# Patient Record
Sex: MALE | Race: WHITE | NOT HISPANIC OR LATINO | Employment: UNEMPLOYED | URBAN - METROPOLITAN AREA
[De-identification: names, ages, dates, MRNs, and addresses within clinical notes are randomized per-mention and may not be internally consistent; named-entity substitution may affect disease eponyms.]

---

## 2017-09-12 ENCOUNTER — HISTORICAL (OUTPATIENT)
Dept: ADMINISTRATIVE | Facility: HOSPITAL | Age: 57
End: 2017-09-12

## 2017-10-24 ENCOUNTER — HISTORICAL (OUTPATIENT)
Dept: ADMINISTRATIVE | Facility: HOSPITAL | Age: 57
End: 2017-10-24

## 2022-04-29 NOTE — OP NOTE
DATE OF SURGERY:    10/24/2017    SURGEON:  Cheyanne Banda MD    PREOPERATIVE DIAGNOSIS:  Phacolytic glaucoma, neovascular glaucoma with mature cataract.    POSTOPERATIVE DIAGNOSIS:  Phacolytic glaucoma, neovascular glaucoma with mature cataract.    PROCEDURE:  Phacoemulsification with intraocular lens implant of the right eye.    ANESTHESIA:  General.    COMPLICATIONS:  None.    ESTIMATED BLOOD LOSS:  None.    INDICATIONS:  The patient had previous trauma at the age of 10 years old and developed a traumatic cataract and resultant neovascular glaucoma that was treated with cyclocryotherapy.  Following cyclocryotherapy, the patient developed phacolytic glaucoma causing painful right eye.  Different surgical options were discussed with the patient including enucleation.  The patient did not want to undergo enucleation.  Advantages, risks and benefits of surgical intervention with phacoemulsification of intraocular lens implant was discussed with the patient. The patient acknowledged understanding and wishes to proceed.  Informed consent was obtained from the patient in the office.    PROCEDURE IN DETAIL:  The patient was brought into the operating room and placed in supine position.  Anesthesia was undertaken without complications.  The operative eye, periorbital region were prepped and draped in usual manner for intraocular surgery. A wire lid speculum was placed in the operative eye for adequate exposure to the surgical site.  A paracentesis was made temporally.  A small amount of Healon was injected into the anterior chamber followed by Vision Blue for adequate staining of the anterior capsule.  A cataract wound was made at 7 o'clock through clear cornea at the limbus.  A cystotome was used to make can opener capsulorrhexis afte a 25 gauge needle was used to aspirate liquified cortex.  The mature cataract was removed with divide and conquer technique.  The remaining cortical material was removed with  irrigation, aspiration handpiece.  A 18.0 ZC blue lens was placed in the operative eye.  Viscoelastic was removed using the I&D.  10-0 nylon suture was placed at the cataract wound and postoperative injections were given. The patient was cleaned in     wet to dry fashion.  Maxitrol ointment placed on the operative eye followed by pressure patch and shield.  The patient was turned over to Anesthesia in stable condition.        ______________________________  MD BROCK Wilson/KHOI  DD:  10/24/2017  Time:  04:08PM  DT:  10/25/2017  Time:  10:16AM  Job #:  [id number]

## 2022-04-29 NOTE — OP NOTE
DATE OF SURGERY:    9-12-17    SURGEON:  Cheyanne Banda MD    PREOPERATIVE DIAGNOSES:   Chronic angle closure right eye.    POSTOPERATIVE DIAGNOSES:  Same s/p procedure    OPERATION PERFORMED:   Cyclocryotherapy right eye    ANESTHESIA: Mac plus retrobulbar injection.    COMPLICATIONS:  None.    ESTIMATED BLOOD LOSS:  Less than 1 mL.    INDICATIONS:  The patient was evaluated in clinic and noted to elevated iop despite max medication.  Advantages, risks and benefits of different surgical options were discussed.  Informed consent was obtained from the patient in the office.    PROCEDURE IN DETAIL:  The patient was brought into the operating room and placed in supine position. MAC anesthesia was undertaken without complications.   The operative eye and periorbital region were prepped and draped in the usual manner for intraocular surgery. A wire lid speculum was placed in the operative eye for adequate exposure.     Cyclocryotherpy was then merformed inferiorly for 6 clock hours lasting 60 seconds each. .  Maxitrol ointment was applied to the operative eye followed by patch and shield.  The patient tolerated the procedure well and was turned over to anesthesia in stable condition.

## 2024-01-10 ENCOUNTER — RESEARCH ENCOUNTER (OUTPATIENT)
Dept: RESEARCH | Facility: HOSPITAL | Age: 64
End: 2024-01-10

## 2024-01-10 DIAGNOSIS — Z00.6 RESEARCH SUBJECT: Primary | ICD-10-CM

## 2024-01-10 NOTE — RESEARCH
Sponsor: [email or web address]     Study Title/IRB Number: [id number]    Principle Investigator: Dr. Baljit Spencer    Patient eligibility was checked prior to enrollment in the study. Patient met the following inclusion and exclusion criteria:     INCLUSION CRITERIA  Participant age is 50 years or older at the time of signing the Informed Consent form  Participant is capable of giving signed Informed Consent, which includes compliance with the requirements and restrictions in the informed consent form and the protocol    EXCLUSION CRITERIA  Participant is undergoing or referred for diagnostic evaluation due to clinical suspicion for cancer  Participant has a personal history of invasive or hematologic malignancy, diagnosed within the last 3 years prior to expected enrollment date or diagnosed greater than 3 years prior to expected enrollment date and never treated  Participant has had definitive treatment for invasive or hematologic malignancy within the 3 years prior to expected enrollment date  Participant is not able to comply with protocol procedures  Participant is not a current patient at a participating center  Participant is currently enrolled or was previously enrolled in another [employer]-sponsored study  Participant is current or previous employee/contractor of [email or web address]  Participant is currently pregnant (by participant's self-report of pregnancy status)    Prior to the Informed Consent (IC) being signed, or any study protocol required data collection, testing, procedure, or intervention being performed, the following was done and/or discussed:  Patient was given a copy of the IC for review   Purpose of the study and qualifications to participate   Study design, Follow up schedule, and tests or procedures done at each visit  Confidentiality and HIPAA Authorization for Release of Medical Records for the research trial/ subject's rights/research related injury  Risk, Benefits, Alternative Treatments, Compensation and  Costs  Participation in the research trial is voluntary and patient may withdraw at anytime  Contact information for study related questions    Patient verbalizes understanding of the above: Yes  Contact information for CRC and PI given to patient: Yes  Patient able to adequately summarize: the purpose of the study, the risks associated with the study, and all procedures, testing, and follow-ups associated with the study: Yes    Patient signed the informed consent form for the research study with an IRB approval date of 4/25/2022. Each page of the consent form was reviewed with patient and all questions answered satisfactorily.   Patient received a copy of the consent form. The original consent was scanned into electronic medical records.    Anthropometric Data    Participant is a 63 y.o., White, Not  or /a, male  Participant height:   01/10/2024   6'3   Participant weight:   Wt Readings from Last 1 Encounters:   01/10/2024    210       Smoking History and Alcohol use     Please indicate whether the participant smoked at least 100 cigarettes in their lifetime:   No  Please indicate whether the participant is a current smoker:  No  Age participant started smoking cigarettes: Not Applicable  Age participant stopped smoking cigarettes: Not Applicable  During their time as a smoker, please indicate if the participant stopped smoking for a month or more: Not Applicable  Please indicate how many cigarettes per day did the participant smoke on average for the majority of their time as a smoker: Blank single: Not Applicable    During the last 12 months, how often did the participant have any kind of drink containing alcohol? Count as one drink a 12 ounce can or glass of beer, a 5 ounce glass of wine, or a drink containing 1 shot of liquor. Participant has never consumed alcohol before during their life}  During the last 12 months, how many drinks did the participant have on days when they drank alcohol?Not  Applicable    Blood Draw    Following IC being signed and prior to blood draw, patient completed all baseline/pretest questionnaires. The following specimens were collected from the pt at the time of this encounter via peripheral blood draw.    Blood draw location: Right Arm  Needle used: 21 gauge butterfly needle  Blood draw amount: 40ml  Blood draw time: 11:19 1/10/2024

## 2024-01-23 ENCOUNTER — TELEPHONE (OUTPATIENT)
Dept: RESEARCH | Facility: HOSPITAL | Age: 64
End: 2024-01-23

## 2024-01-23 NOTE — TELEPHONE ENCOUNTER
Eduar Pathfinder 2022.003    Eduar ID: [id number]     Results the [facility] Fernando Multi Cancer Early Detection test and were reviewed by PI Dr Spencer. Patient was called and notified of test results, there was no signal detected.    Patient reminded, this was a screening test, so we still highly encourage them to continue other normal health screenings  and to continue to adhere to guideline-recommended cancer screening.    Patient was asked about completing 30 day questionnaire online and was agreeable.

## 2025-05-23 ENCOUNTER — HOSPITAL ENCOUNTER (OUTPATIENT)
Dept: RADIOLOGY | Facility: HOSPITAL | Age: 65
Discharge: HOME | End: 2025-05-23
Attending: FAMILY MEDICINE
Payer: COMMERCIAL

## 2025-05-23 DIAGNOSIS — H65.02: ICD-10-CM

## 2025-05-23 DIAGNOSIS — H66.92 LEFT OTITIS MEDIA WITH SPONTANEOUS RUPTURE OF EARDRUM: ICD-10-CM

## 2025-05-23 DIAGNOSIS — J01.90 ACUTE SINUSITIS, UNSPECIFIED: ICD-10-CM

## 2025-05-23 DIAGNOSIS — J34.89 OVERDEVELOPMENT OF NASAL BONES: Primary | ICD-10-CM

## 2025-05-23 DIAGNOSIS — H72.92 LEFT OTITIS MEDIA WITH SPONTANEOUS RUPTURE OF EARDRUM: ICD-10-CM

## 2025-05-23 DIAGNOSIS — J34.89 OVERDEVELOPMENT OF NASAL BONES: ICD-10-CM

## 2025-05-23 PROCEDURE — 70486 CT MAXILLOFACIAL W/O DYE: CPT | Mod: TC

## 2025-05-29 DIAGNOSIS — K80.20 CHOLELITHIASIS: Primary | ICD-10-CM

## 2025-05-30 ENCOUNTER — HOSPITAL ENCOUNTER (OUTPATIENT)
Dept: RADIOLOGY | Facility: HOSPITAL | Age: 65
Discharge: HOME | End: 2025-05-30
Attending: COLON & RECTAL SURGERY
Payer: COMMERCIAL

## 2025-05-30 ENCOUNTER — OFFICE VISIT (OUTPATIENT)
Dept: SURGICAL ONCOLOGY | Facility: CLINIC | Age: 65
End: 2025-05-30
Payer: COMMERCIAL

## 2025-05-30 ENCOUNTER — ANESTHESIA EVENT (OUTPATIENT)
Dept: SURGERY | Facility: HOSPITAL | Age: 65
End: 2025-05-30
Payer: COMMERCIAL

## 2025-05-30 VITALS
TEMPERATURE: 98 F | HEART RATE: 72 BPM | SYSTOLIC BLOOD PRESSURE: 128 MMHG | WEIGHT: 198 LBS | HEIGHT: 75 IN | BODY MASS INDEX: 24.62 KG/M2 | OXYGEN SATURATION: 99 % | DIASTOLIC BLOOD PRESSURE: 86 MMHG

## 2025-05-30 DIAGNOSIS — K80.10 CALCULUS OF GALLBLADDER WITH CHOLECYSTITIS WITHOUT BILIARY OBSTRUCTION, UNSPECIFIED CHOLECYSTITIS ACUITY: Primary | ICD-10-CM

## 2025-05-30 DIAGNOSIS — K80.10 CALCULUS OF GALLBLADDER WITH CHOLECYSTITIS WITHOUT BILIARY OBSTRUCTION, UNSPECIFIED CHOLECYSTITIS ACUITY: ICD-10-CM

## 2025-05-30 DIAGNOSIS — K81.0 ACUTE CHOLECYSTITIS: ICD-10-CM

## 2025-05-30 DIAGNOSIS — K80.20 CHOLELITHIASIS: ICD-10-CM

## 2025-05-30 PROCEDURE — 99999 PR PBB SHADOW E&M-EST. PATIENT-LVL III: CPT | Mod: PBBFAC,,, | Performed by: SURGERY

## 2025-05-30 PROCEDURE — 71046 X-RAY EXAM CHEST 2 VIEWS: CPT | Mod: TC

## 2025-05-30 RX ORDER — SODIUM CHLORIDE 9 MG/ML
INJECTION, SOLUTION INTRAVENOUS CONTINUOUS
OUTPATIENT
Start: 2025-05-30

## 2025-05-30 RX ORDER — ATORVASTATIN CALCIUM 10 MG/1
10 TABLET, FILM COATED ORAL DAILY
COMMUNITY

## 2025-05-30 RX ORDER — CEFAZOLIN SODIUM 2 G/50ML
2 SOLUTION INTRAVENOUS
OUTPATIENT
Start: 2025-06-02

## 2025-05-30 RX ORDER — ENOXAPARIN SODIUM 100 MG/ML
30 INJECTION SUBCUTANEOUS EVERY 24 HOURS
OUTPATIENT
Start: 2025-05-30

## 2025-05-30 RX ORDER — MUPIROCIN 20 MG/G
OINTMENT TOPICAL
OUTPATIENT
Start: 2025-05-30

## 2025-05-30 RX ORDER — INDOCYANINE GREEN AND WATER 25 MG
2.5 KIT INJECTION ONCE
OUTPATIENT
Start: 2025-06-02

## 2025-05-30 RX ORDER — METFORMIN HYDROCHLORIDE 500 MG/1
500 TABLET ORAL 2 TIMES DAILY WITH MEALS
COMMUNITY

## 2025-05-30 NOTE — PROGRESS NOTES
Chief complaint:  Cholelithiasis     HPI:  64-year-old male presents with postprandial nausea and food aversion, no fevers, chills or symptoms of jaundice.  Ultrasound reveals gallbladder sludge as well as cholelithiasis no evidence of ductal dilation.  No evidence of biliary obstruction on laboratory studies.        Past Medical and Surgical History  Allergies :   Patient has no known allergies.    @[facility]@  Medical :   He has a past medical history of Diabetes mellitus, type 2 and High cholesterol.    Surgical :   He has a past surgical history that includes Eye surgery; [place] tooth extraction; and Tonsillectomy.     Family History  His family history includes Diabetes in his mother; Heart disease in his father.    Social History  He reports that he has never smoked. He has never used smokeless tobacco. He reports that he does not currently use alcohol. He reports that he does not use drugs.     Review of Systems   Constitutional:  Negative for activity change, appetite change, chills, diaphoresis, fatigue and fever.   HENT:  Negative for congestion, ear pain, tinnitus and trouble swallowing.    Eyes:  Negative for photophobia and pain.   Respiratory:  Negative for apnea, cough, choking, chest tightness, shortness of breath and stridor.    Cardiovascular:  Negative for chest pain, palpitations and leg swelling.   Endocrine: Negative for cold intolerance and heat intolerance.   Genitourinary:  Negative for difficulty urinating, dysuria, enuresis, flank pain, frequency and hematuria.   Musculoskeletal:  Negative for arthralgias, back pain and gait problem.   Neurological:  Negative for dizziness, seizures, syncope, facial asymmetry, speech difficulty, weakness, light-headedness, numbness and headaches.   Psychiatric/Behavioral:  Negative for agitation, behavioral problems, confusion and decreased concentration.    All other systems reviewed and are negative.       Objective   Physical Exam  Vitals and nursing  note reviewed.   Constitutional:       General: He is not in acute distress.     Appearance: Normal appearance. He is not ill-appearing, toxic-appearing or diaphoretic.   HENT:      Head: Normocephalic and atraumatic.      Right Ear: External ear normal.      Left Ear: External ear normal.      Nose: Nose normal. No congestion or rhinorrhea.      Mouth/Throat:      Mouth: Mucous membranes are moist.      Pharynx: Oropharynx is clear.   Eyes:      General: No scleral icterus.     Extraocular Movements: Extraocular movements intact.      Conjunctiva/sclera: Conjunctivae normal.      Pupils: Pupils are equal, round, and reactive to light.   Cardiovascular:      Rate and Rhythm: Normal rate and regular rhythm.      Pulses: Normal pulses.      Heart sounds: Normal heart sounds. No murmur heard.     No friction rub. No gallop.   Pulmonary:      Effort: Pulmonary effort is normal. No respiratory distress.      Breath sounds: Normal breath sounds. No stridor. No wheezing or rhonchi.   Chest:      Chest wall: No tenderness.   Abdominal:      General: Abdomen is flat. There is no distension.      Palpations: Abdomen is soft. There is no mass.      Tenderness: There is no abdominal tenderness. There is no right CVA tenderness, left CVA tenderness, guarding or rebound.      Hernia: No hernia is present.   Musculoskeletal:         General: No swelling, tenderness, deformity or signs of injury.      Cervical back: Normal range of motion and neck supple. No rigidity or tenderness.      Right lower leg: No edema.      Left lower leg: No edema.   Lymphadenopathy:      Cervical: No cervical adenopathy.   Skin:     General: Skin is warm.      Capillary Refill: Capillary refill takes less than 2 seconds.      Coloration: Skin is not jaundiced or pale.      Findings: No bruising, erythema, lesion or rash.   Neurological:      General: No focal deficit present.      Mental Status: He is alert and oriented to person, place, and time.  "Mental status is at baseline.      Cranial Nerves: No cranial nerve deficit.      Sensory: No sensory deficit.      Motor: No weakness.      Coordination: Coordination normal.      Gait: Gait normal.   Psychiatric:         Mood and Affect: Mood normal.         Behavior: Behavior normal.         Thought Content: Thought content normal.         Judgment: Judgment normal.       VITAL SIGNS: 24 HR MIN & MAX LAST    @FLOWSTAT(6:24::1)@  97.6 °F (36.4 °C)        @FLOWSTAT(5:24::1)@  128/86     @FLOWSTAT(8:24::1)@  72     @FLOWSTAT(9:24::1)@       @FLOWSTAT(10:24::1)@  99 %      HT: 6' 3" (190.5 cm)  WT: 89.8 kg (198 lb)  BMI: 24.7       Assessment & Plan     Symptomatic cholelithiasis      Schedule laparoscopic/robotic cholecystectomy    The risks and benefits of the procedure were explained in detail using layman terms, including the pros and cons of any implant that may be used, all questions were addressed, the patient gives consent to proceed    "

## 2025-05-30 NOTE — H&P (VIEW-ONLY)
Chief complaint:  Cholelithiasis     HPI:  64-year-old male presents with postprandial nausea and food aversion, no fevers, chills or symptoms of jaundice.  Ultrasound reveals gallbladder sludge as well as cholelithiasis no evidence of ductal dilation.  No evidence of biliary obstruction on laboratory studies.        Past Medical and Surgical History  Allergies :   Patient has no known allergies.    @[facility]@  Medical :   He has a past medical history of Diabetes mellitus, type 2 and High cholesterol.    Surgical :   He has a past surgical history that includes Eye surgery; [place] tooth extraction; and Tonsillectomy.     Family History  His family history includes Diabetes in his mother; Heart disease in his father.    Social History  He reports that he has never smoked. He has never used smokeless tobacco. He reports that he does not currently use alcohol. He reports that he does not use drugs.     Review of Systems   Constitutional:  Negative for activity change, appetite change, chills, diaphoresis, fatigue and fever.   HENT:  Negative for congestion, ear pain, tinnitus and trouble swallowing.    Eyes:  Negative for photophobia and pain.   Respiratory:  Negative for apnea, cough, choking, chest tightness, shortness of breath and stridor.    Cardiovascular:  Negative for chest pain, palpitations and leg swelling.   Endocrine: Negative for cold intolerance and heat intolerance.   Genitourinary:  Negative for difficulty urinating, dysuria, enuresis, flank pain, frequency and hematuria.   Musculoskeletal:  Negative for arthralgias, back pain and gait problem.   Neurological:  Negative for dizziness, seizures, syncope, facial asymmetry, speech difficulty, weakness, light-headedness, numbness and headaches.   Psychiatric/Behavioral:  Negative for agitation, behavioral problems, confusion and decreased concentration.    All other systems reviewed and are negative.       Objective   Physical Exam  Vitals and nursing  note reviewed.   Constitutional:       General: He is not in acute distress.     Appearance: Normal appearance. He is not ill-appearing, toxic-appearing or diaphoretic.   HENT:      Head: Normocephalic and atraumatic.      Right Ear: External ear normal.      Left Ear: External ear normal.      Nose: Nose normal. No congestion or rhinorrhea.      Mouth/Throat:      Mouth: Mucous membranes are moist.      Pharynx: Oropharynx is clear.   Eyes:      General: No scleral icterus.     Extraocular Movements: Extraocular movements intact.      Conjunctiva/sclera: Conjunctivae normal.      Pupils: Pupils are equal, round, and reactive to light.   Cardiovascular:      Rate and Rhythm: Normal rate and regular rhythm.      Pulses: Normal pulses.      Heart sounds: Normal heart sounds. No murmur heard.     No friction rub. No gallop.   Pulmonary:      Effort: Pulmonary effort is normal. No respiratory distress.      Breath sounds: Normal breath sounds. No stridor. No wheezing or rhonchi.   Chest:      Chest wall: No tenderness.   Abdominal:      General: Abdomen is flat. There is no distension.      Palpations: Abdomen is soft. There is no mass.      Tenderness: There is no abdominal tenderness. There is no right CVA tenderness, left CVA tenderness, guarding or rebound.      Hernia: No hernia is present.   Musculoskeletal:         General: No swelling, tenderness, deformity or signs of injury.      Cervical back: Normal range of motion and neck supple. No rigidity or tenderness.      Right lower leg: No edema.      Left lower leg: No edema.   Lymphadenopathy:      Cervical: No cervical adenopathy.   Skin:     General: Skin is warm.      Capillary Refill: Capillary refill takes less than 2 seconds.      Coloration: Skin is not jaundiced or pale.      Findings: No bruising, erythema, lesion or rash.   Neurological:      General: No focal deficit present.      Mental Status: He is alert and oriented to person, place, and time.  "Mental status is at baseline.      Cranial Nerves: No cranial nerve deficit.      Sensory: No sensory deficit.      Motor: No weakness.      Coordination: Coordination normal.      Gait: Gait normal.   Psychiatric:         Mood and Affect: Mood normal.         Behavior: Behavior normal.         Thought Content: Thought content normal.         Judgment: Judgment normal.       VITAL SIGNS: 24 HR MIN & MAX LAST    @FLOWSTAT(6:24::1)@  97.6 °F (36.4 °C)        @FLOWSTAT(5:24::1)@  128/86     @FLOWSTAT(8:24::1)@  72     @FLOWSTAT(9:24::1)@       @FLOWSTAT(10:24::1)@  99 %      HT: 6' 3" (190.5 cm)  WT: 89.8 kg (198 lb)  BMI: 24.7       Assessment & Plan     Symptomatic cholelithiasis      Schedule laparoscopic/robotic cholecystectomy    The risks and benefits of the procedure were explained in detail using layman terms, including the pros and cons of any implant that may be used, all questions were addressed, the patient gives consent to proceed    "

## 2025-06-02 ENCOUNTER — HOSPITAL ENCOUNTER (OUTPATIENT)
Facility: HOSPITAL | Age: 65
Discharge: HOME | End: 2025-06-02
Attending: SURGERY | Admitting: SURGERY
Payer: COMMERCIAL

## 2025-06-02 ENCOUNTER — ANESTHESIA (OUTPATIENT)
Dept: SURGERY | Facility: HOSPITAL | Age: 65
End: 2025-06-02
Payer: COMMERCIAL

## 2025-06-02 VITALS
SYSTOLIC BLOOD PRESSURE: 138 MMHG | WEIGHT: 191.56 LBS | HEART RATE: 71 BPM | TEMPERATURE: 97 F | HEIGHT: 75 IN | OXYGEN SATURATION: 99 % | DIASTOLIC BLOOD PRESSURE: 78 MMHG | BODY MASS INDEX: 23.82 KG/M2 | RESPIRATION RATE: 20 BRPM

## 2025-06-02 DIAGNOSIS — K80.10 CALCULUS OF GALLBLADDER WITH CHOLECYSTITIS WITHOUT BILIARY OBSTRUCTION, UNSPECIFIED CHOLECYSTITIS ACUITY: ICD-10-CM

## 2025-06-02 DIAGNOSIS — K81.0 ACUTE CHOLECYSTITIS: ICD-10-CM

## 2025-06-02 LAB — POCT GLUCOSE: 109 MG/DL (ref 70–110)

## 2025-06-02 PROCEDURE — 36000710: Performed by: SURGERY

## 2025-06-02 PROCEDURE — 71000016 HC POSTOP RECOV ADDL HR: Performed by: SURGERY

## 2025-06-02 PROCEDURE — 71000033 HC RECOVERY, INTIAL HOUR: Performed by: SURGERY

## 2025-06-02 PROCEDURE — 71000015 HC POSTOP RECOV 1ST HR: Performed by: SURGERY

## 2025-06-02 PROCEDURE — 47563 LAPARO CHOLECYSTECTOMY/GRAPH: CPT | Mod: ,,, | Performed by: SURGERY

## 2025-06-02 PROCEDURE — 63600175 PHARM REV CODE 636 W HCPCS: Performed by: NURSE ANESTHETIST, CERTIFIED REGISTERED

## 2025-06-02 PROCEDURE — 37000009 HC ANESTHESIA EA ADD 15 MINS: Performed by: SURGERY

## 2025-06-02 PROCEDURE — 37000008 HC ANESTHESIA 1ST 15 MINUTES: Performed by: SURGERY

## 2025-06-02 PROCEDURE — 63600175 PHARM REV CODE 636 W HCPCS: Performed by: SURGERY

## 2025-06-02 PROCEDURE — 25000003 PHARM REV CODE 250: Performed by: SURGERY

## 2025-06-02 PROCEDURE — 25000003 PHARM REV CODE 250: Performed by: NURSE ANESTHETIST, CERTIFIED REGISTERED

## 2025-06-02 PROCEDURE — 47563 LAPARO CHOLECYSTECTOMY/GRAPH: CPT | Mod: AS,,, | Performed by: NURSE PRACTITIONER

## 2025-06-02 PROCEDURE — 63600175 PHARM REV CODE 636 W HCPCS: Performed by: ANESTHESIOLOGY

## 2025-06-02 PROCEDURE — 36000711: Performed by: SURGERY

## 2025-06-02 PROCEDURE — 25000003 PHARM REV CODE 250: Performed by: ANESTHESIOLOGY

## 2025-06-02 PROCEDURE — 27201423 OPTIME MED/SURG SUP & DEVICES STERILE SUPPLY: Performed by: SURGERY

## 2025-06-02 RX ORDER — GLUCAGON 1 MG
1 KIT INJECTION
Status: DISCONTINUED | OUTPATIENT
Start: 2025-06-02 | End: 2025-06-02 | Stop reason: HOSPADM

## 2025-06-02 RX ORDER — LIDOCAINE HYDROCHLORIDE 20 MG/ML
INJECTION, SOLUTION EPIDURAL; INFILTRATION; INTRACAUDAL; PERINEURAL
Status: DISCONTINUED | OUTPATIENT
Start: 2025-06-02 | End: 2025-06-02

## 2025-06-02 RX ORDER — INDOCYANINE GREEN AND WATER 25 MG
2.5 KIT INJECTION ONCE
Status: COMPLETED | OUTPATIENT
Start: 2025-06-02 | End: 2025-06-02

## 2025-06-02 RX ORDER — MIDAZOLAM HYDROCHLORIDE 1 MG/ML
INJECTION INTRAMUSCULAR; INTRAVENOUS
Status: DISCONTINUED | OUTPATIENT
Start: 2025-06-02 | End: 2025-06-02

## 2025-06-02 RX ORDER — IPRATROPIUM BROMIDE AND ALBUTEROL SULFATE 2.5; .5 MG/3ML; MG/3ML
3 SOLUTION RESPIRATORY (INHALATION)
Status: DISCONTINUED | OUTPATIENT
Start: 2025-06-02 | End: 2025-06-02 | Stop reason: HOSPADM

## 2025-06-02 RX ORDER — HYDROCODONE BITARTRATE AND ACETAMINOPHEN 5; 325 MG/1; MG/1
1 TABLET ORAL EVERY 6 HOURS PRN
Qty: 10 TABLET | Refills: 0 | Status: SHIPPED | OUTPATIENT
Start: 2025-06-02

## 2025-06-02 RX ORDER — ONDANSETRON HYDROCHLORIDE 2 MG/ML
INJECTION, SOLUTION INTRAVENOUS
Status: DISCONTINUED | OUTPATIENT
Start: 2025-06-02 | End: 2025-06-02

## 2025-06-02 RX ORDER — ROCURONIUM BROMIDE 10 MG/ML
INJECTION, SOLUTION INTRAVENOUS
Status: DISCONTINUED | OUTPATIENT
Start: 2025-06-02 | End: 2025-06-02

## 2025-06-02 RX ORDER — ACETAMINOPHEN 10 MG/ML
1000 INJECTION, SOLUTION INTRAVENOUS ONCE
Status: COMPLETED | OUTPATIENT
Start: 2025-06-02 | End: 2025-06-02

## 2025-06-02 RX ORDER — CELECOXIB 200 MG/1
200 CAPSULE ORAL ONCE
Status: COMPLETED | OUTPATIENT
Start: 2025-06-02 | End: 2025-06-02

## 2025-06-02 RX ORDER — PROPOFOL 10 MG/ML
VIAL (ML) INTRAVENOUS
Status: DISCONTINUED | OUTPATIENT
Start: 2025-06-02 | End: 2025-06-02

## 2025-06-02 RX ORDER — FENTANYL CITRATE 50 UG/ML
INJECTION, SOLUTION INTRAMUSCULAR; INTRAVENOUS
Status: DISCONTINUED | OUTPATIENT
Start: 2025-06-02 | End: 2025-06-02

## 2025-06-02 RX ORDER — ENOXAPARIN SODIUM 100 MG/ML
30 INJECTION SUBCUTANEOUS EVERY 24 HOURS
Status: DISCONTINUED | OUTPATIENT
Start: 2025-06-02 | End: 2025-06-02 | Stop reason: HOSPADM

## 2025-06-02 RX ORDER — ONDANSETRON HYDROCHLORIDE 2 MG/ML
4 INJECTION, SOLUTION INTRAVENOUS DAILY PRN
Status: DISCONTINUED | OUTPATIENT
Start: 2025-06-02 | End: 2025-06-02 | Stop reason: HOSPADM

## 2025-06-02 RX ORDER — CEFAZOLIN SODIUM 1 G/3ML
2 INJECTION, POWDER, FOR SOLUTION INTRAMUSCULAR; INTRAVENOUS
Status: DISCONTINUED | OUTPATIENT
Start: 2025-06-02 | End: 2025-06-02 | Stop reason: HOSPADM

## 2025-06-02 RX ORDER — HYDROMORPHONE HYDROCHLORIDE 2 MG/ML
0.2 INJECTION, SOLUTION INTRAMUSCULAR; INTRAVENOUS; SUBCUTANEOUS EVERY 5 MIN PRN
Status: DISCONTINUED | OUTPATIENT
Start: 2025-06-02 | End: 2025-06-02 | Stop reason: HOSPADM

## 2025-06-02 RX ORDER — HYDROMORPHONE HYDROCHLORIDE 2 MG/ML
0.4 INJECTION, SOLUTION INTRAMUSCULAR; INTRAVENOUS; SUBCUTANEOUS EVERY 5 MIN PRN
Status: DISCONTINUED | OUTPATIENT
Start: 2025-06-02 | End: 2025-06-02 | Stop reason: HOSPADM

## 2025-06-02 RX ORDER — ONDANSETRON 4 MG/1
8 TABLET, ORALLY DISINTEGRATING ORAL EVERY 6 HOURS PRN
Status: DISCONTINUED | OUTPATIENT
Start: 2025-06-02 | End: 2025-06-02 | Stop reason: HOSPADM

## 2025-06-02 RX ORDER — PROCHLORPERAZINE EDISYLATE 5 MG/ML
5 INJECTION INTRAMUSCULAR; INTRAVENOUS EVERY 30 MIN PRN
Status: DISCONTINUED | OUTPATIENT
Start: 2025-06-02 | End: 2025-06-02 | Stop reason: HOSPADM

## 2025-06-02 RX ORDER — MUPIROCIN 20 MG/G
OINTMENT TOPICAL
Status: DISCONTINUED | OUTPATIENT
Start: 2025-06-02 | End: 2025-06-02 | Stop reason: HOSPADM

## 2025-06-02 RX ORDER — SCOPOLAMINE 1 MG/3D
1 PATCH, EXTENDED RELEASE TRANSDERMAL ONCE
Status: DISCONTINUED | OUTPATIENT
Start: 2025-06-02 | End: 2025-06-02 | Stop reason: HOSPADM

## 2025-06-02 RX ORDER — MIDAZOLAM HYDROCHLORIDE 2 MG/2ML
2 INJECTION, SOLUTION INTRAMUSCULAR; INTRAVENOUS ONCE AS NEEDED
Status: DISCONTINUED | OUTPATIENT
Start: 2025-06-02 | End: 2025-06-02 | Stop reason: HOSPADM

## 2025-06-02 RX ORDER — DEXAMETHASONE SODIUM PHOSPHATE 4 MG/ML
INJECTION, SOLUTION INTRA-ARTICULAR; INTRALESIONAL; INTRAMUSCULAR; INTRAVENOUS; SOFT TISSUE
Status: DISCONTINUED | OUTPATIENT
Start: 2025-06-02 | End: 2025-06-02

## 2025-06-02 RX ORDER — BUPIVACAINE HYDROCHLORIDE 5 MG/ML
INJECTION, SOLUTION EPIDURAL; INTRACAUDAL; PERINEURAL
Status: DISCONTINUED | OUTPATIENT
Start: 2025-06-02 | End: 2025-06-02 | Stop reason: HOSPADM

## 2025-06-02 RX ORDER — PHENYLEPHRINE HYDROCHLORIDE 10 MG/ML
INJECTION INTRAVENOUS
Status: DISCONTINUED | OUTPATIENT
Start: 2025-06-02 | End: 2025-06-02

## 2025-06-02 RX ORDER — LIDOCAINE HYDROCHLORIDE 10 MG/ML
1 INJECTION, SOLUTION EPIDURAL; INFILTRATION; INTRACAUDAL; PERINEURAL ONCE
Status: DISCONTINUED | OUTPATIENT
Start: 2025-06-02 | End: 2025-06-02 | Stop reason: HOSPADM

## 2025-06-02 RX ORDER — SODIUM CHLORIDE 9 MG/ML
INJECTION, SOLUTION INTRAVENOUS CONTINUOUS
Status: DISCONTINUED | OUTPATIENT
Start: 2025-06-02 | End: 2025-06-02 | Stop reason: HOSPADM

## 2025-06-02 RX ORDER — ONDANSETRON HYDROCHLORIDE 2 MG/ML
4 INJECTION, SOLUTION INTRAVENOUS ONCE
Status: COMPLETED | OUTPATIENT
Start: 2025-06-02 | End: 2025-06-02

## 2025-06-02 RX ORDER — SODIUM CHLORIDE, SODIUM GLUCONATE, SODIUM ACETATE, POTASSIUM CHLORIDE AND MAGNESIUM CHLORIDE 30; 37; 368; 526; 502 MG/100ML; MG/100ML; MG/100ML; MG/100ML; MG/100ML
INJECTION, SOLUTION INTRAVENOUS CONTINUOUS
Status: DISCONTINUED | OUTPATIENT
Start: 2025-06-02 | End: 2025-06-02 | Stop reason: HOSPADM

## 2025-06-02 RX ORDER — ENOXAPARIN SODIUM 100 MG/ML
30 INJECTION SUBCUTANEOUS EVERY 24 HOURS
Status: DISCONTINUED | OUTPATIENT
Start: 2025-06-02 | End: 2025-06-02

## 2025-06-02 RX ADMIN — PHENYLEPHRINE HYDROCHLORIDE 100 MCG: 10 INJECTION INTRAVENOUS at 07:06

## 2025-06-02 RX ADMIN — SUGAMMADEX 189 MG: 100 INJECTION, SOLUTION INTRAVENOUS at 08:06

## 2025-06-02 RX ADMIN — FENTANYL CITRATE 100 MCG: 50 INJECTION, SOLUTION INTRAMUSCULAR; INTRAVENOUS at 07:06

## 2025-06-02 RX ADMIN — CELECOXIB 200 MG: 200 CAPSULE ORAL at 06:06

## 2025-06-02 RX ADMIN — ONDANSETRON 4 MG: 2 INJECTION INTRAMUSCULAR; INTRAVENOUS at 06:06

## 2025-06-02 RX ADMIN — SODIUM CHLORIDE, SODIUM GLUCONATE, SODIUM ACETATE, POTASSIUM CHLORIDE AND MAGNESIUM CHLORIDE: 526; 502; 368; 37; 30 INJECTION, SOLUTION INTRAVENOUS at 07:06

## 2025-06-02 RX ADMIN — FENTANYL CITRATE 50 MCG: 50 INJECTION, SOLUTION INTRAMUSCULAR; INTRAVENOUS at 08:06

## 2025-06-02 RX ADMIN — ROCURONIUM BROMIDE 70 MG: 10 SOLUTION INTRAVENOUS at 07:06

## 2025-06-02 RX ADMIN — INDOCYANINE GREEN AND WATER 2.5 MG: KIT at 06:06

## 2025-06-02 RX ADMIN — ENOXAPARIN SODIUM 30 MG: 30 INJECTION SUBCUTANEOUS at 06:06

## 2025-06-02 RX ADMIN — PROPOFOL 160 MG: 10 INJECTION, EMULSION INTRAVENOUS at 07:06

## 2025-06-02 RX ADMIN — DEXAMETHASONE SODIUM PHOSPHATE 4 MG: 4 INJECTION, SOLUTION INTRA-ARTICULAR; INTRALESIONAL; INTRAMUSCULAR; INTRAVENOUS; SOFT TISSUE at 07:06

## 2025-06-02 RX ADMIN — HYDROMORPHONE HYDROCHLORIDE 0.4 MG: 2 INJECTION, SOLUTION INTRAMUSCULAR; INTRAVENOUS; SUBCUTANEOUS at 09:06

## 2025-06-02 RX ADMIN — MIDAZOLAM HYDROCHLORIDE 2 MG: 1 INJECTION, SOLUTION INTRAMUSCULAR; INTRAVENOUS at 07:06

## 2025-06-02 RX ADMIN — CEFAZOLIN 2 G: 330 INJECTION, POWDER, FOR SOLUTION INTRAMUSCULAR; INTRAVENOUS at 07:06

## 2025-06-02 RX ADMIN — ACETAMINOPHEN 1000 MG: 10 INJECTION, SOLUTION INTRAVENOUS at 06:06

## 2025-06-02 RX ADMIN — PHENYLEPHRINE HYDROCHLORIDE 50 MCG: 10 INJECTION INTRAVENOUS at 07:06

## 2025-06-02 RX ADMIN — FENTANYL CITRATE 50 MCG: 50 INJECTION, SOLUTION INTRAMUSCULAR; INTRAVENOUS at 07:06

## 2025-06-02 RX ADMIN — MUPIROCIN: 20 OINTMENT TOPICAL at 06:06

## 2025-06-02 RX ADMIN — LIDOCAINE HYDROCHLORIDE 4 ML: 20 INJECTION, SOLUTION EPIDURAL; INFILTRATION; INTRACAUDAL; PERINEURAL at 07:06

## 2025-06-02 RX ADMIN — SCOPOLAMINE 1 PATCH: 1 PATCH TRANSDERMAL at 07:06

## 2025-06-02 RX ADMIN — ONDANSETRON 4 MG: 2 INJECTION INTRAMUSCULAR; INTRAVENOUS at 08:06

## 2025-06-02 NOTE — DISCHARGE INSTRUCTIONS
OK to drive 4 days after surgery if no longer taking narcotic pain medication.    Keep sites CLEAN AND DRY for 24 hours. OK to shower afterwards. Do NOT submerge incision under water. Do not apply lotions, creams, or ointments. Leave lap sites open to air. Glue will fall off, no need to remove. No tub baths or soaking for 4 weeks. Ok for shower starting 24 hours after surgery.    NO heavy lifting. DO NOT lift objects greater than 10 lbs. Your doctor will advise at your follow up appointment when to resume normal activity.     Monitor for infection: chills, fever (100.4 F), redness or drainage at surgical site. Notify your doctor if any of these occur.     Report to your nearest ER if you experience any SUDDEN/SEVERE abdominal pain, trouble breathing, uncontrolled pain, profound weakness.      BLEEDING: if you experience uncontrollable bleeding, contact your doctor and go to ER.    NAUSEA: due to the anesthesia, you may experience nausea for up to 24 hours. If nausea and vomiting last longer, contact your doctor.     INFECTION:  watch for any signs or symptoms of infection such as chills, fever, redness or drainage at surgical site. Notify your doctor.     PAIN : take your pain medications as directed. If the pain medications are not helping, notify your doctor.

## 2025-06-02 NOTE — OP NOTE
Date of Surgery:  6/2/2025    Surgeon:  Faheem Pineda MD    Assistant:  Linda ALVARENGA                                        Pre-op Diagnosis:  Acute cholecystitis, cholelithiasis    Post-op Diagnosis:  Same    Procedure:  Laparoscopic cholecystectomy with intraoperative cholangiography (via ICG fluorescence)    Anesthesia:  GETA    EBL:  Less than 25 cc    Specimens:  Gallbladder    Findings:  Acute inflammation and distention of the gallbladder.  ICG was administered preoperatively and used for intraoperative fluorescence and cholangiography with clear identification of the anatomy    Procedure in detail: After informed consent was obtained the patient was brought to the operating room and placed in the supine position.  ICG was administered intravenously preoperatively.  General endotracheal anesthesia was administered without difficulty.  The patient's abdomen was prepped and draped in sterile fashion.  After infiltration with local anesthesia, a small incision was made above the umbilicus and an optical trocar was used to enter the peritoneal cavity under direct vision.  Pneumoperitoneum was achieved without difficulty.  Additional ports were placed in the epigastrium and right upper quadrant under direct vision.  The patient was placed in steep reverse Trendelenburg position, and tilted towards the left.  The gallbladder was visualized in the right upper quadrant, and demonstrated signs of chronic inflammation and distention.  The fundus was retracted cephalad, the infundibulum was retracted laterally.  The cystic duct was identified and dissected circumferentially using gentle blunt dissection at its confluence with the gallbladder.  The cystic artery was similarly dissected at its entrance into the gallbladder.  Firefly visualization/fluorescence was then used and the biliary anatomy was defined via fluorescence, there was no aberrant anatomy, the common bile duct was clearly visualized as well as the  cystic duct.  Once the anatomy was clearly defined, and the critical view of safety was obtained, the cystic duct was divided between clips with 2 clips placed on the staying inside.  The cystic artery was divided between clips.  The gallbladder was excised from gallbladder fossa using hook electrocautery.  Meticulous hemostasis was achieved.  The gallbladder is decompressed, placed in an Endo Catch bag and removed.  The right upper quadrant was irrigated and inspected for hemostasis which appeared to be excellent.  Ports were removed, pneumoperitoneum was relieved, port sites were closed with absorbable suture and sterile dressings were applied.  The patient tolerated the procedure well.  They were brought to recovery room in stable condition        Brief Discharge Note:    Outcome: Satisfactory  Disposition: Discharged home postoperatively in good condition  Followup:  2 weeks  Final Diagnosis same as postoperative diagnosis     Faheem Pineda MD  Surgical Oncology  Complex General, Gastrointestinal and Hepatobiliary Surgery

## 2025-06-02 NOTE — DISCHARGE SUMMARY
[facility] - Periop Services  Discharge Note  Short Stay    Procedure(s) (LRB):  XI ROBOTIC CHOLECYSTECTOMY (N/A)      OUTCOME: Patient tolerated treatment/procedure well without complication and is now ready for discharge.    DISPOSITION: Home or Self Care    FINAL DIAGNOSIS:  Calculus of gallbladder with cholecystitis without biliary obstruction    FOLLOWUP: In clinic    DISCHARGE INSTRUCTIONS:    Discharge Procedure Orders   Diet Adult Regular     Order Specific Question Answer Comments   Additional restrictions: Light/GI Soft      Lifting restrictions   Order Comments: Do not lift objects heavier than 10 pounds for 2 weeks.     No driving until:   Order Comments: OK to drive 4 days after surgery if no longer taking narcotic pain medication.     No dressing needed   Order Comments: Leave lap sites open to air. Glue will fall off, no need to remove. No tub baths or soaking for 4 weeks. Ok for shower starting 24 hours after surgery.        TIME SPENT ON DISCHARGE: 30 minutes

## 2025-06-03 LAB — PSYCHE PATHOLOGY RESULT: NORMAL

## 2025-06-16 NOTE — PROGRESS NOTES
"  [facility]    Patient ID: [id number]     Chief Complaint: Post Op     HPI:     Parminder Majano is a 64 y.o. male here today for  follow up status post laparoscopic/robotic cholecystectomy with intraoperative cholangiography on 6/2/25. Incisions are healing well and approximated without evidence or erythema or drainage. Patient denies issues with oral intake of liquids of solids, voiding, or bowel movements. His appetite was almost immediately improved. Of note, patient did have an overnight hospital stay post operatively due to kidney stone requiring stent placement. Pathology report revealed chronic cholecystitis with cholelithiasis. Reviewed with patient.         Current Outpatient Medications   Medication Instructions    atorvastatin (LIPITOR) 10 mg, Daily    HYDROcodone-acetaminophen (NORCO) 5-325 mg per tablet 1 tablet, Oral, Every 6 hours PRN    metFORMIN (GLUCOPHAGE) 500 mg, 2 times daily with meals       Review of patient's allergies indicates:  No Known Allergies     Patient Care Team:  Brett Segundo MD as PCP - General (Family Medicine)     Subjective:     Review of Systems   Constitutional:  Negative for chills and fever.   HENT: Negative.     Eyes:  Negative for visual disturbance.   Respiratory:  Negative for chest tightness and shortness of breath.    Cardiovascular:  Negative for chest pain.   Gastrointestinal: Negative.  Negative for nausea and vomiting.   Genitourinary:  Negative for difficulty urinating and hematuria.   Musculoskeletal: Negative.    Skin:  Negative for rash and wound.   Neurological: Negative.    Psychiatric/Behavioral: Negative.         12 point review of systems conducted, negative except as stated in the history of present illness. See HPI for details.    Objective:     Visit Vitals  /83 (BP Location: Left arm, Patient Position: Sitting)   Pulse 76   Ht 6' 3" (1.905 m)   Wt 88.5 kg (195 lb)   SpO2 98%   BMI 24.37 kg/m²       Physical " Exam  Constitutional:       General: He is not in acute distress.  HENT:      Head: Atraumatic.      Nose: Nose normal.      Mouth/Throat:      Mouth: Mucous membranes are moist.   Eyes:      General: No scleral icterus.     Extraocular Movements: Extraocular movements intact.      Conjunctiva/sclera: Conjunctivae normal.   Cardiovascular:      Rate and Rhythm: Normal rate and regular rhythm.      Pulses: Normal pulses.      Heart sounds: Normal heart sounds.   Pulmonary:      Effort: Pulmonary effort is normal. No respiratory distress.      Breath sounds: Normal breath sounds.   Abdominal:      General: Bowel sounds are normal. There is no distension.      Palpations: Abdomen is soft.      Tenderness: There is no abdominal tenderness.      Comments: Lap sites x4 C/D/I   Musculoskeletal:         General: No swelling.      Cervical back: Normal range of motion and neck supple. No rigidity.   Skin:     General: Skin is warm and dry.   Neurological:      General: No focal deficit present.      Mental Status: He is alert and oriented to person, place, and time.   Psychiatric:         Mood and Affect: Mood normal.         Behavior: Behavior normal.         Assessment/Plan:     Symptomatic cholelithiasis     Plan:     Ok to increase activity level and diet as tolerated.   Ok for tub bath when all incisions have healed.   Patient cleared to drive if no longer taking narcotic pain medication.       No follow up appointment necessary.       Linda Hensley, NP-C  General Surgery   Surgical Oncology

## 2025-06-17 ENCOUNTER — OFFICE VISIT (OUTPATIENT)
Dept: SURGICAL ONCOLOGY | Facility: CLINIC | Age: 65
End: 2025-06-17
Payer: COMMERCIAL

## 2025-06-17 VITALS
SYSTOLIC BLOOD PRESSURE: 117 MMHG | OXYGEN SATURATION: 98 % | DIASTOLIC BLOOD PRESSURE: 83 MMHG | HEIGHT: 75 IN | HEART RATE: 76 BPM | BODY MASS INDEX: 24.25 KG/M2 | WEIGHT: 195 LBS

## 2025-06-17 DIAGNOSIS — K81.0 ACUTE CHOLECYSTITIS: Primary | ICD-10-CM

## 2025-06-17 PROCEDURE — 1159F MED LIST DOCD IN RCRD: CPT | Mod: CPTII,S$GLB,, | Performed by: NURSE PRACTITIONER

## 2025-06-17 PROCEDURE — 99999 PR PBB SHADOW E&M-EST. PATIENT-LVL III: CPT | Mod: PBBFAC,,, | Performed by: NURSE PRACTITIONER

## 2025-06-17 PROCEDURE — 99024 POSTOP FOLLOW-UP VISIT: CPT | Mod: S$GLB,,, | Performed by: NURSE PRACTITIONER

## 2025-06-17 PROCEDURE — 3079F DIAST BP 80-89 MM HG: CPT | Mod: CPTII,S$GLB,, | Performed by: NURSE PRACTITIONER

## 2025-06-17 PROCEDURE — 1160F RVW MEDS BY RX/DR IN RCRD: CPT | Mod: CPTII,S$GLB,, | Performed by: NURSE PRACTITIONER

## 2025-06-17 PROCEDURE — 3074F SYST BP LT 130 MM HG: CPT | Mod: CPTII,S$GLB,, | Performed by: NURSE PRACTITIONER

## (undated) DEVICE — NDL HYPO REG 25G X 1 1/2

## (undated) DEVICE — ADHESIVE DERMABOND ADVANCED

## (undated) DEVICE — DRAPE ARM DAVINCI XI

## (undated) DEVICE — SYR 10CC LUER LOCK

## (undated) DEVICE — IRRIGATOR SUCTION W/TIP

## (undated) DEVICE — KIT GEN LAPAROSCOPY LAFAYETTE

## (undated) DEVICE — PAD PINK TRENDELENBURG POS XL

## (undated) DEVICE — SOL NACL IRR 1000ML BTL

## (undated) DEVICE — TROCAR KII FIOS ZTHREAD 11X100

## (undated) DEVICE — COVER TIP CURVED SCISSORS XI

## (undated) DEVICE — GLOVE PROTEXIS HYDROGEL SZ7

## (undated) DEVICE — SOL CLEARIFY VISUALIZATION LAP

## (undated) DEVICE — TOWEL OR DISP STRL BLUE 4/PK

## (undated) DEVICE — SUT MCRYL PLUS 4-0 PS2 27IN

## (undated) DEVICE — CHLORAPREP W TINT 26ML APPL

## (undated) DEVICE — KIT AIRSEAL CANN CAP STD 8MM

## (undated) DEVICE — OBTURATOR BLADELESS 8MM XI CLR

## (undated) DEVICE — HOLDER STRIP-T SELF ADH 2X10IN

## (undated) DEVICE — SEAL CANN UNIVERSAL 5-12MM

## (undated) DEVICE — DRAPE COLUMN DAVINCI XI

## (undated) DEVICE — KIT AIRSEAL BIFURCT FLTR TB

## (undated) DEVICE — GLOVE PROTEXIS LTX MICRO 6.5

## (undated) DEVICE — COVER MAYO STND XL 30X57IN

## (undated) DEVICE — ELECTRODE PATIENT RETURN DISP

## (undated) DEVICE — SOL ELECTROLUBE ANTI-STIC

## (undated) DEVICE — CLIP HEMO-LOK ML

## (undated) DEVICE — KIT SURGICAL TURNOVER